# Patient Record
Sex: MALE | Race: WHITE | NOT HISPANIC OR LATINO | Employment: OTHER | URBAN - METROPOLITAN AREA
[De-identification: names, ages, dates, MRNs, and addresses within clinical notes are randomized per-mention and may not be internally consistent; named-entity substitution may affect disease eponyms.]

---

## 2019-07-25 DIAGNOSIS — K21.9 GASTROESOPHAGEAL REFLUX DISEASE, ESOPHAGITIS PRESENCE NOT SPECIFIED: Primary | ICD-10-CM

## 2019-07-25 RX ORDER — PANTOPRAZOLE SODIUM 40 MG/1
40 TABLET, DELAYED RELEASE ORAL DAILY
Qty: 90 TABLET | Refills: 3 | Status: SHIPPED | OUTPATIENT
Start: 2019-07-25 | End: 2020-07-06

## 2019-07-25 NOTE — TELEPHONE ENCOUNTER
----- Message from Ziggy Foster sent at 7/25/2019  9:01 AM CDT -----  Contact: Patient  Type:  RX Refill Request    Who Called:  Patient  Refill or New Rx:  New Rx  RX Name and Strength:  pantoprozol (Protonix) 40 mg  How is the patient currently taking it? (ex. 1XDay):  x1 daily  Is this a 30 day or 90 day RX:  90  Preferred Pharmacy with phone number:  [facility] mail order  Local or Mail Order:  Local  Ordering Provider:  Gutierrez Butler Call Back Number:  [phone]  Additional Information:  Patient was seeing Luke at [facility] location before he moved to [place] and he is almost out of medication and is requesting refill to last till he can establish care with Gastro provider. Please advise patient when refill is complete

## 2019-08-01 ENCOUNTER — OFFICE VISIT (OUTPATIENT)
Dept: GASTROENTEROLOGY | Facility: CLINIC | Age: 84
End: 2019-08-01
Payer: MEDICARE

## 2019-08-01 VITALS
BODY MASS INDEX: 21.22 KG/M2 | DIASTOLIC BLOOD PRESSURE: 82 MMHG | WEIGHT: 140 LBS | HEART RATE: 56 BPM | OXYGEN SATURATION: 97 % | SYSTOLIC BLOOD PRESSURE: 156 MMHG | HEIGHT: 68 IN | RESPIRATION RATE: 18 BRPM

## 2019-08-01 DIAGNOSIS — K44.9 HIATAL HERNIA: ICD-10-CM

## 2019-08-01 DIAGNOSIS — K57.30 DIVERTICULOSIS OF LARGE INTESTINE WITHOUT HEMORRHAGE: ICD-10-CM

## 2019-08-01 DIAGNOSIS — K21.9 GASTROESOPHAGEAL REFLUX DISEASE WITHOUT ESOPHAGITIS: ICD-10-CM

## 2019-08-01 DIAGNOSIS — Z85.038 HISTORY OF COLON CANCER: Primary | ICD-10-CM

## 2019-08-01 PROCEDURE — 99203 OFFICE O/P NEW LOW 30 MIN: CPT | Mod: S$PBB,,, | Performed by: INTERNAL MEDICINE

## 2019-08-01 PROCEDURE — 99213 OFFICE O/P EST LOW 20 MIN: CPT | Mod: PBBFAC,PN | Performed by: INTERNAL MEDICINE

## 2019-08-01 PROCEDURE — 99999 PR PBB SHADOW E&M-EST. PATIENT-LVL III: CPT | Mod: PBBFAC,,, | Performed by: INTERNAL MEDICINE

## 2019-08-01 PROCEDURE — 99203 PR OFFICE/OUTPT VISIT, NEW, LEVL III, 30-44 MIN: ICD-10-PCS | Mod: S$PBB,,, | Performed by: INTERNAL MEDICINE

## 2019-08-01 PROCEDURE — 99999 PR PBB SHADOW E&M-EST. PATIENT-LVL III: ICD-10-PCS | Mod: PBBFAC,,, | Performed by: INTERNAL MEDICINE

## 2019-08-01 RX ORDER — PRAVASTATIN SODIUM 40 MG/1
TABLET ORAL
COMMUNITY
Start: 2019-06-11

## 2019-08-01 RX ORDER — TRIAMCINOLONE ACETONIDE 0.25 MG/G
CREAM TOPICAL
Refills: 3 | COMMUNITY
Start: 2019-07-01

## 2019-08-01 RX ORDER — MINOXIDIL 2.5 MG/1
TABLET ORAL
COMMUNITY
Start: 2019-07-14

## 2019-08-01 RX ORDER — CARVEDILOL 6.25 MG/1
TABLET ORAL
COMMUNITY
Start: 2019-07-14

## 2019-08-01 RX ORDER — CLOPIDOGREL BISULFATE 75 MG/1
TABLET ORAL
COMMUNITY
Start: 2019-04-25

## 2019-08-01 RX ORDER — NITROGLYCERIN 80 MG/1
PATCH TRANSDERMAL
COMMUNITY
Start: 2019-07-14

## 2019-08-01 RX ORDER — LISINOPRIL 20 MG/1
TABLET ORAL
COMMUNITY
Start: 2019-07-14

## 2019-08-01 RX ORDER — CLONIDINE HYDROCHLORIDE 0.1 MG/1
TABLET ORAL
COMMUNITY
Start: 2019-07-04

## 2019-08-01 NOTE — PROGRESS NOTES
He will continue his reflux regimen.  He continues his vitamins and minerals.  He continues his bowel program with fiber to have daily bowel movements.  I have requested his labs.  He is followed by the urologist in the cardiologist.  He continues his current medications.

## 2019-08-01 NOTE — PROGRESS NOTES
Subjective:       Patient ID: Bennett Rico is a 87 y.o. male.    Chief Complaint: No chief complaint on file.    He denies gastrointestinal symptoms.  He has a history of hiatal hernia gastroesophageal reflux and duodenal ulcer.  He has symptom-free on the Protonix.  He denies hematemesis hematochezia pyrosis dyspepsia dysphagia or aspiration.  He has had a right colectomy for cancer years ago.  He has diverticulosis and internal hemorrhoids.  He is on a high-fiber diet with daily bowel movements.  He denies abdominal pain.  He is followed by the neurologist and the cardiologist.  He was hospitalized 2 years ago for hypertension and had a stent in place.  He is on the Plavix and he has easy bruising.  He is physically active and touch is grass.  He denies GI symptoms.  He is not interested in further GI evaluation unless he would have a problem.  The Protonix has been the best PPI for him.      Allergies:  Review of patient's allergies indicates:   Allergen Reactions    Lactose     Lotrel [amlodipine-benazepril]        Medications:    Current Outpatient Medications:     carvedilol (COREG) 6.25 MG tablet, , Disp: , Rfl:     cloNIDine (CATAPRES) 0.1 MG tablet, , Disp: , Rfl:     clopidogrel (PLAVIX) 75 mg tablet, , Disp: , Rfl:     lisinopril (PRINIVIL,ZESTRIL) 20 MG tablet, , Disp: , Rfl:     minoxidil (LONITEN) 2.5 MG tablet, , Disp: , Rfl:     nitroGLYCERIN 0.4 MG/HR TD PT24 (NITRODUR) 0.4 mg/hr, , Disp: , Rfl:     pantoprazole (PROTONIX) 40 MG tablet, Take 1 tablet (40 mg total) by mouth once daily., Disp: 90 tablet, Rfl: 3    pravastatin (PRAVACHOL) 40 MG tablet, , Disp: , Rfl:     triamcinolone acetonide 0.025% (KENALOG) 0.025 % cream, APPLY CREAM TOPICALLY THREE TIMES DAILY, Disp: , Rfl: 3    Past Medical History:   Diagnosis Date    Colon cancer     Colon polyp     GERD (gastroesophageal reflux disease)     HTN (hypertension)     Prostate cancer     Renal cancer, left        Past Surgical  History:   Procedure Laterality Date    CHOLECYSTECTOMY      COLON SURGERY      COLONOSCOPY      LAPAROSCOPIC ROBOT-ASSISTED SURGICAL REMOVAL OF KIDNEY USING DA JENNIFER XI Left     PROSTATECTOMY      UPPER GASTROINTESTINAL ENDOSCOPY           Review of Systems   Constitutional: Negative for appetite change, fever and unexpected weight change.   HENT: Negative for trouble swallowing.         No jaundice.   Respiratory: Negative for cough, shortness of breath and wheezing.         He denies cardiopulmonary symptoms.  He has never used tobacco.   Cardiovascular: Negative for chest pain.        His hypertension hyperlipidemia well controlled.  He denies irregular heartbeat or dyspnea on exertion.   Gastrointestinal: Negative for abdominal distention, abdominal pain, anal bleeding, blood in stool, constipation and diarrhea.        A cholecystectomy.  He has had a right colectomy for cancer.  He has daily bowel movements without straining.   Genitourinary:        He has had a left nephrectomy and also prostatectomy for cancer.  He states that the urologist as told him is testing is normal including the PSA.  He has occasional urinary frequency.   Musculoskeletal: Positive for arthralgias and back pain. Negative for neck pain.   Skin: Negative for pallor and rash.   Neurological: Negative for dizziness, seizures, syncope, speech difficulty, weakness and numbness.   Hematological: Negative for adenopathy.   Psychiatric/Behavioral: Negative for confusion.       Objective:      Physical Exam   Constitutional: He is oriented to person, place, and time.   Thin elderly nonicteric white male   HENT:   Head: Normocephalic.   Eyes: Pupils are equal, round, and reactive to light. EOM are normal.   Neck: Normal range of motion. Neck supple. No tracheal deviation present. No thyromegaly present.   Cardiovascular: Normal rate, regular rhythm and normal heart sounds.   Pulmonary/Chest: Effort normal and breath sounds normal.    Abdominal: Soft. Bowel sounds are normal. He exhibits no distension and no mass. There is no tenderness. There is no rebound and no guarding. No hernia.   The abdomen is soft is on plane without tenderness masses organomegaly   Bowel sounds are normal.  There healed surgical scars.   Musculoskeletal: Normal range of motion.   He can ambulate normally.  He can go from the sitting to the standing position without difficulty.  He can get on the exam table with minimal assistance.   Lymphadenopathy:     He has no cervical adenopathy.   Neurological: He is alert and oriented to person, place, and time. No cranial nerve deficit.   Skin: Skin is warm and dry.   Psychiatric: He has a normal mood and affect. His behavior is normal.   Vitals reviewed.        Plan:       History of colon cancer    Diverticulosis of large intestine without hemorrhage    Gastroesophageal reflux disease without esophagitis    Hiatal hernia

## 2019-08-01 NOTE — PATIENT INSTRUCTIONS
He will continue his reflux regimen Protonix and fiber supplements.  He continues his usual activities.  He continues his current medications.  He follows up with his cardiologist and neurologist.  I have requested his labs.

## 2020-07-04 DIAGNOSIS — K21.9 GASTROESOPHAGEAL REFLUX DISEASE, ESOPHAGITIS PRESENCE NOT SPECIFIED: ICD-10-CM

## 2020-07-06 RX ORDER — PANTOPRAZOLE SODIUM 40 MG/1
TABLET, DELAYED RELEASE ORAL
Qty: 90 TABLET | Refills: 3 | Status: SHIPPED | OUTPATIENT
Start: 2020-07-06 | End: 2021-07-16 | Stop reason: SDUPTHER

## 2021-07-16 DIAGNOSIS — K21.9 GASTROESOPHAGEAL REFLUX DISEASE: ICD-10-CM

## 2021-07-16 RX ORDER — PANTOPRAZOLE SODIUM 40 MG/1
40 TABLET, DELAYED RELEASE ORAL DAILY
Qty: 90 TABLET | Refills: 3 | Status: SHIPPED | OUTPATIENT
Start: 2021-07-16 | End: 2021-07-19 | Stop reason: SDUPTHER

## 2021-07-19 DIAGNOSIS — K21.9 GASTROESOPHAGEAL REFLUX DISEASE: ICD-10-CM

## 2021-07-20 DIAGNOSIS — K21.9 GASTROESOPHAGEAL REFLUX DISEASE: ICD-10-CM

## 2021-07-20 RX ORDER — PANTOPRAZOLE SODIUM 40 MG/1
40 TABLET, DELAYED RELEASE ORAL DAILY
Qty: 90 TABLET | Refills: 3 | Status: SHIPPED | OUTPATIENT
Start: 2021-07-20 | End: 2021-07-20 | Stop reason: SDUPTHER

## 2021-07-20 RX ORDER — PANTOPRAZOLE SODIUM 40 MG/1
40 TABLET, DELAYED RELEASE ORAL DAILY
Qty: 90 TABLET | Refills: 3 | Status: SHIPPED | OUTPATIENT
Start: 2021-07-20 | End: 2022-07-25

## 2022-07-24 DIAGNOSIS — K21.9 GASTROESOPHAGEAL REFLUX DISEASE: ICD-10-CM

## 2022-07-25 RX ORDER — PANTOPRAZOLE SODIUM 40 MG/1
TABLET, DELAYED RELEASE ORAL
Qty: 60 TABLET | Refills: 0 | Status: SHIPPED | OUTPATIENT
Start: 2022-07-25 | End: 2022-09-26

## 2022-09-26 DIAGNOSIS — K21.9 GASTROESOPHAGEAL REFLUX DISEASE: ICD-10-CM

## 2022-09-26 RX ORDER — PANTOPRAZOLE SODIUM 40 MG/1
TABLET, DELAYED RELEASE ORAL
Qty: 60 TABLET | Refills: 0 | Status: SHIPPED | OUTPATIENT
Start: 2022-09-26